# Patient Record
Sex: FEMALE | Race: BLACK OR AFRICAN AMERICAN | URBAN - METROPOLITAN AREA
[De-identification: names, ages, dates, MRNs, and addresses within clinical notes are randomized per-mention and may not be internally consistent; named-entity substitution may affect disease eponyms.]

---

## 2019-01-10 ENCOUNTER — HISTORICAL (OUTPATIENT)
Dept: ADMINISTRATIVE | Facility: HOSPITAL | Age: 49
End: 2019-01-10

## 2019-02-21 ENCOUNTER — HISTORICAL (OUTPATIENT)
Dept: PREADMISSION TESTING | Facility: HOSPITAL | Age: 49
End: 2019-02-21

## 2019-02-21 LAB
ABS NEUT (OLG): 3.14 X10(3)/MCL (ref 2.1–9.2)
BASOPHILS # BLD AUTO: 0 X10(3)/MCL (ref 0–0.2)
BASOPHILS NFR BLD AUTO: 1 %
BUN SERPL-MCNC: 9 MG/DL (ref 7–18)
CALCIUM SERPL-MCNC: 9.1 MG/DL (ref 8.5–10.1)
CHLORIDE SERPL-SCNC: 108 MMOL/L (ref 98–107)
CO2 SERPL-SCNC: 26 MMOL/L (ref 21–32)
CREAT SERPL-MCNC: 0.54 MG/DL (ref 0.55–1.02)
CREAT/UREA NIT SERPL: 16.7
EOSINOPHIL # BLD AUTO: 0.2 X10(3)/MCL (ref 0–0.9)
EOSINOPHIL NFR BLD AUTO: 3 %
ERYTHROCYTE [DISTWIDTH] IN BLOOD BY AUTOMATED COUNT: 13.2 % (ref 11.5–17)
GLUCOSE SERPL-MCNC: 86 MG/DL (ref 74–106)
HCT VFR BLD AUTO: 40.6 % (ref 37–47)
HGB BLD-MCNC: 12.4 GM/DL (ref 12–16)
LYMPHOCYTES # BLD AUTO: 2.9 X10(3)/MCL (ref 0.6–4.6)
LYMPHOCYTES NFR BLD AUTO: 42 %
MCH RBC QN AUTO: 30.9 PG (ref 27–31)
MCHC RBC AUTO-ENTMCNC: 30.5 GM/DL (ref 33–36)
MCV RBC AUTO: 101.2 FL (ref 80–94)
MONOCYTES # BLD AUTO: 0.5 X10(3)/MCL (ref 0.1–1.3)
MONOCYTES NFR BLD AUTO: 7 %
NEUTROPHILS # BLD AUTO: 3.14 X10(3)/MCL (ref 2.1–9.2)
NEUTROPHILS NFR BLD AUTO: 46 %
PLATELET # BLD AUTO: 382 X10(3)/MCL (ref 130–400)
PMV BLD AUTO: 10.7 FL (ref 9.4–12.4)
POTASSIUM SERPL-SCNC: 4.4 MMOL/L (ref 3.5–5.1)
RBC # BLD AUTO: 4.01 X10(6)/MCL (ref 4.2–5.4)
SODIUM SERPL-SCNC: 140 MMOL/L (ref 136–145)
WBC # SPEC AUTO: 6.8 X10(3)/MCL (ref 4.5–11.5)

## 2019-02-26 ENCOUNTER — HISTORICAL (OUTPATIENT)
Dept: SURGERY | Facility: HOSPITAL | Age: 49
End: 2019-02-26

## 2022-04-07 ENCOUNTER — HISTORICAL (OUTPATIENT)
Dept: ADMINISTRATIVE | Facility: HOSPITAL | Age: 52
End: 2022-04-07

## 2022-04-23 VITALS
SYSTOLIC BLOOD PRESSURE: 169 MMHG | WEIGHT: 163.13 LBS | DIASTOLIC BLOOD PRESSURE: 90 MMHG | HEIGHT: 64 IN | BODY MASS INDEX: 27.85 KG/M2

## 2022-04-28 NOTE — OP NOTE
DATE OF SURGERY:    02/26/2019    SURGEON:  Keyon Mendoza MD    PREOPERATIVE DIAGNOSIS:  Right knee medial meniscus tear.    POSTOPERATIVE DIAGNOSIS:  Right knee medial meniscus tear.    PROCEDURE PERFORMED:    1. Right knee arthroscopy with partial medial meniscectomy.  2. Right knee arthroscopic debridement anterior knee.    ANESTHESIA:  LMA.    IV FLUIDS:  As per Anesthesia.    ESTIMATED BLOOD LOSS:  Less than 5 cc.    COUNTS:  Correct.    COMPLICATIONS:  None.    DISPOSITION:  Stable to PACU.    INDICATIONS FOR PROCEDURE:  Ms. Bernard is a 48-year-old female with continued pain and loss of motion of her right knee.  She has failed conservative treatment.  She has been followed in my clinic.  She had an MRI demonstrating the above findings.  The risks, benefits, and alternatives were discussed with the patient, the patient's family in detail.  The risks included, but were not limited to, pain, bleeding, infection, damage to blood vessels or nerves, heart attack, stroke, death, need for operation, continued pain, continued loss of motion, retear, post-traumatic arthritis, need for additional surgery.  Patient understood these risks and wanted to proceed with surgical intervention.  Informed consent was obtained.    PROCEDURE IN DETAIL:  The patient was found in preop holding by Anesthesia and found fit for surgery.  She was taken to the operating room and placed on the operating table in supine position.  All bony prominences were well padded.  Time-out was called to identify correct patient, correct procedure, and correct site, and all were in agreement.  The patient underwent LMA anesthesia without complications.  She was then prepped and draped in normal sterile fashion, leaving the right lower extremity exposed for surgery.  A well padded tourniquet was placed on her right upper thigh.  Approximate tourniquet time was 13 minutes at 300.  She received preoperative antibiotics.  After exsanguination of the  right lower extremity, tourniquet was inflated.  Anterolateral portal was then made through a 1 cm incision.  Camera was introduced in suprapatellar pouch.  Next, the medial and lateral gutters were inspected.  No loose bodies.  Her patella was tracking appropriately.  Camera was introduced in the medial compartment.  Anteromedial portal was then made through a 1 cm incision.  Next, probing of the posterior horn and posterior body of the medial meniscus demonstrated an unstable medial meniscus tear to probing.  It was longitudinal in nature.  Given this, patient had a partial medial meniscectomy of the posterior horn and body.  With use of 3.5 shaver and straight biter, the tear was removed in its entirety.  The remaining meniscus was intact and found to be stable after probing.  She had some minimal arthritic changes of the medial compartment.  Camera was introduced in the intercondylar notch where patient had a redundant scar tissue and fat pad causing impingement.  With use of 3.5 shaver patient had debridement of the anterior knee.  The ACL and PCL were inspected and found to be intact.  Camera was introduced in the lateral compartment.  Lateral meniscus was then probed and found to be stable without any signs of tear or instability.  She had minimal arthritic changes in the lateral compartment.  Camera was introduced back in the suprapatellar pouch.  All excess fluid was removed.  Tourniquet was released, hemostasis achieved.  Copious irrigation was used to wash the 2 incisions.  The incisions were then closed with 2-0 nylon suture in standard horizontal mattress configuration.  Xeroform, 4 x 4's, soft tissue dressing, Ace wrap was placed on the right lower extremity.  She was then awoken by Anesthesia and brought to PACU in stable condition.        ______________________________  Keyon Mendoza MD    AK/UN  DD:  02/26/2019  Time:  10:10AM  DT:  02/26/2019  Time:  11:30AM  Job #:  [id number]

## 2025-04-02 RX ORDER — PANTOPRAZOLE SODIUM 20 MG/1
20 TABLET, DELAYED RELEASE ORAL DAILY
COMMUNITY

## 2025-04-02 RX ORDER — CETIRIZINE HYDROCHLORIDE 10 MG/1
10 TABLET ORAL DAILY
COMMUNITY

## 2025-04-02 RX ORDER — VALSARTAN 160 MG/1
160 TABLET ORAL DAILY
COMMUNITY

## 2025-04-02 RX ORDER — CYPROHEPTADINE HYDROCHLORIDE 4 MG/1
4 TABLET ORAL 3 TIMES DAILY PRN
Status: ON HOLD | COMMUNITY
End: 2025-04-04 | Stop reason: CLARIF

## 2025-04-02 RX ORDER — TRAZODONE HYDROCHLORIDE 100 MG/1
100 TABLET ORAL NIGHTLY
COMMUNITY

## 2025-04-02 RX ORDER — ATORVASTATIN CALCIUM 10 MG/1
10 TABLET, FILM COATED ORAL DAILY
COMMUNITY

## 2025-04-02 RX ORDER — CARVEDILOL 12.5 MG/1
12.5 TABLET ORAL 2 TIMES DAILY WITH MEALS
COMMUNITY

## 2025-04-04 ENCOUNTER — HOSPITAL ENCOUNTER (OUTPATIENT)
Facility: HOSPITAL | Age: 55
Discharge: HOME | End: 2025-04-04
Attending: PODIATRIST | Admitting: PODIATRIST
Payer: COMMERCIAL

## 2025-04-04 ENCOUNTER — ANESTHESIA (OUTPATIENT)
Dept: SURGERY | Facility: HOSPITAL | Age: 55
End: 2025-04-04
Payer: COMMERCIAL

## 2025-04-04 ENCOUNTER — ANESTHESIA EVENT (OUTPATIENT)
Dept: SURGERY | Facility: HOSPITAL | Age: 55
End: 2025-04-04
Payer: COMMERCIAL

## 2025-04-04 VITALS
RESPIRATION RATE: 18 BRPM | WEIGHT: 155.88 LBS | HEART RATE: 61 BPM | TEMPERATURE: 98 F | SYSTOLIC BLOOD PRESSURE: 185 MMHG | HEIGHT: 64 IN | OXYGEN SATURATION: 98 % | BODY MASS INDEX: 26.61 KG/M2 | DIASTOLIC BLOOD PRESSURE: 92 MMHG

## 2025-04-04 DIAGNOSIS — M79.672 LEFT FOOT PAIN: ICD-10-CM

## 2025-04-04 DIAGNOSIS — M67.471 GANGLION, RIGHT ANKLE AND FOOT: ICD-10-CM

## 2025-04-04 DIAGNOSIS — D21.22 BENIGN NEOPLASM OF CONNECTIVE AND SOFT TISSUE OF LEG, INCLUDING HIP, LEFT: ICD-10-CM

## 2025-04-04 PROCEDURE — 36000707: Performed by: PODIATRIST

## 2025-04-04 PROCEDURE — 37000008 HC ANESTHESIA 1ST 15 MINUTES: Performed by: PODIATRIST

## 2025-04-04 PROCEDURE — 37000009 HC ANESTHESIA EA ADD 15 MINS: Performed by: PODIATRIST

## 2025-04-04 PROCEDURE — 63600175 PHARM REV CODE 636 W HCPCS: Performed by: PODIATRIST

## 2025-04-04 PROCEDURE — 63600175 PHARM REV CODE 636 W HCPCS: Performed by: NURSE ANESTHETIST, CERTIFIED REGISTERED

## 2025-04-04 PROCEDURE — 63600175 PHARM REV CODE 636 W HCPCS: Performed by: ANESTHESIOLOGY

## 2025-04-04 PROCEDURE — 25000003 PHARM REV CODE 250: Performed by: NURSE ANESTHETIST, CERTIFIED REGISTERED

## 2025-04-04 PROCEDURE — 36000706: Performed by: PODIATRIST

## 2025-04-04 PROCEDURE — 71000015 HC POSTOP RECOV 1ST HR: Performed by: PODIATRIST

## 2025-04-04 PROCEDURE — 71000016 HC POSTOP RECOV ADDL HR: Performed by: PODIATRIST

## 2025-04-04 RX ORDER — BUPIVACAINE HYDROCHLORIDE 5 MG/ML
INJECTION, SOLUTION EPIDURAL; INTRACAUDAL; PERINEURAL
Status: DISCONTINUED
Start: 2025-04-04 | End: 2025-04-04 | Stop reason: HOSPADM

## 2025-04-04 RX ORDER — FENTANYL CITRATE 50 UG/ML
INJECTION, SOLUTION INTRAMUSCULAR; INTRAVENOUS
Status: DISCONTINUED | OUTPATIENT
Start: 2025-04-04 | End: 2025-04-04

## 2025-04-04 RX ORDER — MIDAZOLAM HYDROCHLORIDE 2 MG/2ML
2 INJECTION, SOLUTION INTRAMUSCULAR; INTRAVENOUS ONCE AS NEEDED
Status: DISCONTINUED | OUTPATIENT
Start: 2025-04-04 | End: 2025-04-04 | Stop reason: HOSPADM

## 2025-04-04 RX ORDER — MIDAZOLAM HYDROCHLORIDE 2 MG/2ML
2 INJECTION, SOLUTION INTRAMUSCULAR; INTRAVENOUS ONCE
Status: COMPLETED | OUTPATIENT
Start: 2025-04-04 | End: 2025-04-04

## 2025-04-04 RX ORDER — ASPIRIN 325 MG
50000 TABLET, DELAYED RELEASE (ENTERIC COATED) ORAL
COMMUNITY
Start: 2025-02-10

## 2025-04-04 RX ORDER — LIDOCAINE HYDROCHLORIDE 10 MG/ML
INJECTION, SOLUTION EPIDURAL; INFILTRATION; INTRACAUDAL; PERINEURAL
Status: DISCONTINUED | OUTPATIENT
Start: 2025-04-04 | End: 2025-04-04

## 2025-04-04 RX ORDER — SODIUM CHLORIDE, SODIUM GLUCONATE, SODIUM ACETATE, POTASSIUM CHLORIDE AND MAGNESIUM CHLORIDE 30; 37; 368; 526; 502 MG/100ML; MG/100ML; MG/100ML; MG/100ML; MG/100ML
INJECTION, SOLUTION INTRAVENOUS CONTINUOUS
Status: DISCONTINUED | OUTPATIENT
Start: 2025-04-04 | End: 2025-04-04 | Stop reason: HOSPADM

## 2025-04-04 RX ORDER — HYDROCODONE BITARTRATE AND ACETAMINOPHEN 5; 325 MG/1; MG/1
1 TABLET ORAL EVERY 4 HOURS PRN
Status: DISCONTINUED | OUTPATIENT
Start: 2025-04-04 | End: 2025-04-04 | Stop reason: HOSPADM

## 2025-04-04 RX ORDER — CARIPRAZINE 1.5 MG/1
1.5 CAPSULE, GELATIN COATED ORAL
COMMUNITY
Start: 2025-03-21

## 2025-04-04 RX ORDER — BUPIVACAINE HYDROCHLORIDE 5 MG/ML
INJECTION, SOLUTION EPIDURAL; INTRACAUDAL; PERINEURAL
Status: DISCONTINUED | OUTPATIENT
Start: 2025-04-04 | End: 2025-04-04 | Stop reason: HOSPADM

## 2025-04-04 RX ORDER — ONDANSETRON 4 MG/1
4 TABLET, ORALLY DISINTEGRATING ORAL ONCE
Status: DISCONTINUED | OUTPATIENT
Start: 2025-04-04 | End: 2025-04-04 | Stop reason: HOSPADM

## 2025-04-04 RX ORDER — CEFAZOLIN SODIUM 1 G/3ML
1 INJECTION, POWDER, FOR SOLUTION INTRAMUSCULAR; INTRAVENOUS
Status: COMPLETED | OUTPATIENT
Start: 2025-04-04 | End: 2025-04-04

## 2025-04-04 RX ORDER — LIDOCAINE HYDROCHLORIDE 10 MG/ML
INJECTION, SOLUTION INFILTRATION; PERINEURAL
Status: DISCONTINUED
Start: 2025-04-04 | End: 2025-04-04 | Stop reason: HOSPADM

## 2025-04-04 RX ORDER — ONDANSETRON HYDROCHLORIDE 2 MG/ML
4 INJECTION, SOLUTION INTRAVENOUS DAILY PRN
OUTPATIENT
Start: 2025-04-04

## 2025-04-04 RX ORDER — KETOROLAC TROMETHAMINE 30 MG/ML
INJECTION, SOLUTION INTRAMUSCULAR; INTRAVENOUS
Status: DISCONTINUED | OUTPATIENT
Start: 2025-04-04 | End: 2025-04-04

## 2025-04-04 RX ORDER — ONDANSETRON HYDROCHLORIDE 2 MG/ML
INJECTION, SOLUTION INTRAVENOUS
Status: DISCONTINUED | OUTPATIENT
Start: 2025-04-04 | End: 2025-04-04

## 2025-04-04 RX ORDER — ACETAMINOPHEN 325 MG/1
650 TABLET ORAL EVERY 4 HOURS PRN
Status: DISCONTINUED | OUTPATIENT
Start: 2025-04-04 | End: 2025-04-04 | Stop reason: HOSPADM

## 2025-04-04 RX ORDER — PROPOFOL 10 MG/ML
VIAL (ML) INTRAVENOUS CONTINUOUS PRN
Status: DISCONTINUED | OUTPATIENT
Start: 2025-04-04 | End: 2025-04-04

## 2025-04-04 RX ORDER — FLUTICASONE PROPIONATE 50 MCG
2 SPRAY, SUSPENSION (ML) NASAL
COMMUNITY
Start: 2024-12-27

## 2025-04-04 RX ORDER — GLUCAGON 1 MG
1 KIT INJECTION
OUTPATIENT
Start: 2025-04-04

## 2025-04-04 RX ORDER — LIDOCAINE HYDROCHLORIDE 10 MG/ML
1 INJECTION, SOLUTION EPIDURAL; INFILTRATION; INTRACAUDAL; PERINEURAL ONCE
Status: DISCONTINUED | OUTPATIENT
Start: 2025-04-04 | End: 2025-04-04 | Stop reason: HOSPADM

## 2025-04-04 RX ORDER — LIDOCAINE HYDROCHLORIDE 10 MG/ML
INJECTION, SOLUTION INFILTRATION; PERINEURAL
Status: DISCONTINUED | OUTPATIENT
Start: 2025-04-04 | End: 2025-04-04 | Stop reason: HOSPADM

## 2025-04-04 RX ORDER — SODIUM CHLORIDE, SODIUM GLUCONATE, SODIUM ACETATE, POTASSIUM CHLORIDE AND MAGNESIUM CHLORIDE 30; 37; 368; 526; 502 MG/100ML; MG/100ML; MG/100ML; MG/100ML; MG/100ML
INJECTION, SOLUTION INTRAVENOUS CONTINUOUS
OUTPATIENT
Start: 2025-04-04 | End: 2025-05-04

## 2025-04-04 RX ORDER — DIPHENHYDRAMINE HYDROCHLORIDE 50 MG/ML
25 INJECTION, SOLUTION INTRAMUSCULAR; INTRAVENOUS ONCE
OUTPATIENT
Start: 2025-04-04 | End: 2025-04-04

## 2025-04-04 RX ORDER — SODIUM CHLORIDE, SODIUM LACTATE, POTASSIUM CHLORIDE, CALCIUM CHLORIDE 600; 310; 30; 20 MG/100ML; MG/100ML; MG/100ML; MG/100ML
INJECTION, SOLUTION INTRAVENOUS CONTINUOUS
Status: DISCONTINUED | OUTPATIENT
Start: 2025-04-04 | End: 2025-04-04 | Stop reason: HOSPADM

## 2025-04-04 RX ADMIN — ONDANSETRON 4 MG: 2 INJECTION INTRAMUSCULAR; INTRAVENOUS at 09:04

## 2025-04-04 RX ADMIN — LIDOCAINE HYDROCHLORIDE 50 MG: 10 INJECTION, SOLUTION EPIDURAL; INFILTRATION; INTRACAUDAL; PERINEURAL at 09:04

## 2025-04-04 RX ADMIN — FENTANYL CITRATE 25 MCG: 50 INJECTION, SOLUTION INTRAMUSCULAR; INTRAVENOUS at 09:04

## 2025-04-04 RX ADMIN — KETOROLAC TROMETHAMINE 30 MG: 30 INJECTION, SOLUTION INTRAMUSCULAR at 10:04

## 2025-04-04 RX ADMIN — CEFAZOLIN 1 G: 330 INJECTION, POWDER, FOR SOLUTION INTRAMUSCULAR; INTRAVENOUS at 09:04

## 2025-04-04 RX ADMIN — PROPOFOL 30 MG: 10 INJECTION, EMULSION INTRAVENOUS at 09:04

## 2025-04-04 RX ADMIN — PROPOFOL 80 MCG/KG/MIN: 10 INJECTION, EMULSION INTRAVENOUS at 09:04

## 2025-04-04 RX ADMIN — SODIUM CHLORIDE: 9 INJECTION, SOLUTION INTRAVENOUS at 09:04

## 2025-04-04 RX ADMIN — PROPOFOL 50 MG: 10 INJECTION, EMULSION INTRAVENOUS at 09:04

## 2025-04-04 RX ADMIN — MIDAZOLAM HYDROCHLORIDE 2 MG: 1 INJECTION, SOLUTION INTRAMUSCULAR; INTRAVENOUS at 09:04

## 2025-04-04 NOTE — DISCHARGE SUMMARY
[facility] Surgical - Periop Services  Discharge Note  Short Stay    Procedure(s) (LRB):  EXCISION, FIBROMA / Left foot (Left)      OUTCOME: Patient tolerated treatment/procedure well without complication and is now ready for discharge.    DISPOSITION: Home or Self Care    FINAL DIAGNOSIS:  <principal problem not specified>    FOLLOWUP: In clinic    DISCHARGE INSTRUCTIONS:  No discharge procedures on file.     TIME SPENT ON DISCHARGE: 5 minutes

## 2025-04-04 NOTE — ANESTHESIA PREPROCEDURE EVALUATION
04/04/2025  Maribell Bernard is a 54 y.o., female.  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     I have reviewed the Nursing Notes. I have reviewed the NPO Status.   I have reviewed the Medications.     Review of Systems  Anesthesia Hx:  No problems with previous Anesthesia               Denies Personal Hx of Anesthesia complications.                    Hematology/Oncology:  Hematology Normal   Oncology Normal                                   EENT/Dental:  EENT/Dental Normal           Cardiovascular:  Exercise tolerance: good   Hypertension                  Functional Capacity good / => 4 METS                         Pulmonary:  Pulmonary Normal                       Renal/:   Denies Chronic Renal Disease.                Hepatic/GI:     GERD                Musculoskeletal:  Musculoskeletal Normal                Neurological:  Neurology Normal                                      Endocrine:  Endocrine Normal          Denies Morbid Obesity / BMI > 40  Dermatological:  Skin Normal    Psych:  Psychiatric Normal                    Physical Exam  General: Alert, Oriented, Well nourished and Cooperative    Airway:  Mallampati: II   Mouth Opening: Normal  TM Distance: Normal  Tongue: Normal  Neck ROM:  Normal ROM    Dental:  Intact    Chest/Lungs:  Clear to auscultation, Normal Respiratory Rate    Heart:  Rate: Normal  Rhythm: Regular Rhythm       Latest Reference Range & Units 02/21/19 11:08   WBC 4.5 - 11.5 x10(3)/mcL 6.8   RBC 4.20 - 5.40 x10(6)/mcL 4.01 (L)   Hemoglobin 12.0 - 16.0 gm/dL 12.4   Hematocrit 37.0 - 47.0 % 40.6   MCV 80.0 - 94.0 fL 101.2 (H)   MCH 27.0 - 31.0 pg 30.9   MCHC 33.0 - 36.0 gm/dL 30.5 (L)   RDW 11.5 - 17.0 % 13.2   Platelet Count 130 - 400 x10(3)/mcL 382   MPV 9.4 - 12.4 fL 10.7   Neut % % 46   LYMPH % % 42   Mono % % 7   Eos % % 3   Basophil % % 1   Gran # (ANC) 2.10 - 9.20 x10(3)/mcL 3.14   Neut # 2.10 - 9.20 x10(3)/mcL 3.14   Lymph # 0.6 - 4.6 x10(3)/mcL 2.9   Mono # 0.1 - 1.3 x10(3)/mcL 0.5   Eos # 0.0 - 0.9 x10(3)/mcL 0.2   Baso # 0.0 - 0.2 x10(3)/mcL 0.0   Sodium 136 - 145 mmol/L 140   Potassium 3.5 - 5.1 mmol/L 4.4   Chloride 98 - 107 mmol/L 108 (H)   CO2 21.0 - 32.0 mmol/L 26.0   BUN 7.0 - 18.0 mg/dL 9.0   Creatinine 0.55 - 1.02 mg/dL 0.54 (L)   BUN/CREAT RATIO  16.7   eGFR if non African American mL/min/1.73 m2 >60   eGFR if African American mL/min/1.73 m2 >60   Glucose 74 - 106 mg/dL 86   Calcium 8.5 - 10.1 mg/dL 9.1   (L): Data is abnormally low  (H): Data is abnormally high    Anesthesia Plan  Type of Anesthesia, risks & benefits discussed:    Anesthesia Type: MAC  Intra-op Monitoring Plan: Standard ASA Monitors  Post Op Pain Control Plan: multimodal analgesia  Induction:  IV  Airway Plan: Direct  Informed Consent: Informed consent signed with the Patient and all parties understand the risks and agree with anesthesia plan.  All questions answered. Patient consented to blood products? Yes  ASA Score: 2  Day of Surgery Review of History & Physical: H&P Update referred to the surgeon/provider.I have interviewed and examined the patient. I have reviewed the patient's H&P dated: There are no significant changes.     Ready For Surgery From Anesthesia Perspective.     .

## 2025-04-04 NOTE — DISCHARGE SUMMARY
[facility] Surgical - Periop Services  Discharge Note  Short Stay    Procedure(s) (LRB):  EXCISION, FIBROMA / Left foot (Left)      OUTCOME: Patient tolerated treatment/procedure well without complication and is now ready for discharge.    DISPOSITION: Home or Self Care    FINAL DIAGNOSIS:  <principal problem not specified>    FOLLOWUP: In clinic    DISCHARGE INSTRUCTIONS:  No discharge procedures on file.     TIME SPENT ON DISCHARGE: 5 minutes

## 2025-04-04 NOTE — TRANSFER OF CARE
"Anesthesia Transfer of Care Note    Patient: Maribell Bernard    Procedure(s) Performed: Procedure(s) (LRB):  EXCISION, FIBROMA / Left foot (Left)    Patient location: OPS    Anesthesia Type: general    Transport from OR: Transported from OR on room air with adequate spontaneous ventilation    Post pain: adequate analgesia    Post assessment: no apparent anesthetic complications    Post vital signs: stable    Level of consciousness: sedated and awake    Nausea/Vomiting: no nausea/vomiting    Complications: none    Transfer of care protocol was followed      Last vitals: Visit Vitals  BP (!) 178/94   Pulse 62   Temp 36.6 °C (97.9 °F) (Oral)   Resp 14   Ht 5' 4" (1.626 m)   Wt 70.7 kg (155 lb 13.8 oz)   SpO2 98%   Breastfeeding No   BMI 26.75 kg/m²     "

## 2025-04-04 NOTE — DISCHARGE INSTRUCTIONS
SURGERY HOME CARE INSTRUCTIONS     ACTIVITY. Keep your affected foot elevated over the next 48 hours. No weight bearing to surgical foot. Use knee scooter.     DIET. Gradually resume your regular diet. Do not drink alcohol beverages.     CARE OF INCISION. Keep your bandage clean, dry, and intact until your follow up appointment. Do not remove your bandages or inspect the wound. A small amount of blood on the bandage is normal. Limited swelling may occur and your skin may look bruised. Limited swelling and bruising are normal and expected. Do not smoke.     BATHING. Sponge bathe until your follow up appointment. You may take a shower when told by your doctor. No tub baths, swimming, or hot tubs for 2 weeks or when told by your doctor.     MEDICATION. You will receive instructions for your pain and/or antibiotic prescriptions. Take pain medication only if you need it and as directed. Take antibiotics as directed until all the pills are gone. If your medications cause stomach upset, headache, rash, or other abnormal reactions, stop taking them and call your doctor immediately.     WHEN TO CALL THE DOCTOR   Pain, burning, or numbness of the toes not relieved by elevation of the leg   Pale or cold toes, bluish nail beds   Red line going up leg   Excessive swelling   Fever over 100.4 degrees or higher and chills  Pain not relieved by the pain medication   Excessive bloody drainage or bandage become overly stained with bloody fluids   Your cast/bandage gets wet or you bump or injure the surgical site

## 2025-04-04 NOTE — ANESTHESIA POSTPROCEDURE EVALUATION
Anesthesia Post Evaluation    Patient: Maribell Bernard    Procedure(s) Performed: Procedure(s) (LRB):  EXCISION, FIBROMA / Left foot (Left)    Final Anesthesia Type: general      Patient location during evaluation: OPS  Patient participation: Yes- Able to Participate  Level of consciousness: awake and alert  Post-procedure vital signs: reviewed and stable  Pain management: adequate  Airway patency: patent    PONV status at discharge: No PONV  Anesthetic complications: no      Cardiovascular status: blood pressure returned to baseline  Respiratory status: unassisted  Hydration status: euvolemic  Follow-up not needed.              Vitals Value Taken Time         Pain/Jodi Score: No data recorded

## 2025-04-07 LAB — PSYCHE PATHOLOGY RESULT: NORMAL

## 2025-04-07 NOTE — OP NOTE
[facility] [address] [place] [address]    PATIENT NAME:      RAUDEL WHITTINGTON  YOB: 1970  CSN:               [id number]  MRN:               [id number]  ADMIT DATE:        04/04/2025 08:13:00  PHYSICIAN:         Nirmal Bryant DPM                          OPERATIVE REPORT      DATE OF SURGERY:    04/04/2025 03:13:24    SURGEON:  Nirmal Bryant DPM    PREOPERATIVE DIAGNOSIS:  Plantar fibroma, medial aspect, left foot.    POSTOPERATIVE DIAGNOSIS:  Plantar fibroma, medial aspect, left foot.    PROCEDURE PERFORMED:  Excision of soft tissue mass, plantar fibroma, plantar   aspect, left foot.    ANESTHESIA:  Local with MAC.    HEMOSTASIS:  Via Esmarch bandage about the left ankle.    DRAINS:  Quarter-inch Penrose drain was utilized for this procedure.    PROCEDURE AS FOLLOWS:  The patient was brought to the OR, placed in supine   position upon the operating table.  At this point, a local infiltrative block   totalling 40 cc of 0.5% Marcaine and 1% lidocaine plain in a 50:50 mixture was   introduced about the left foot and ankle.  The patient was then prepped and   draped in the usual sterile fashion.    Surgical procedure began with a curvilinear incision made about the plantar   aspect of the left foot extending just proximal to the 1st MPJ to the mid arch   area.  All superficial bleeding vessels were ligated with the Bovie.  Dissection   was carried down to deep tissue, where any bleeding vessels were ligated with   the Bovie.  At this point, identification of the plantar fibroma within the   medial band of the plantar fascia was identified.  The proximal aspect of the   fibroma was dissected free and then transected with a 15 blade.  It was then   reflected distally.  Medial and lateral margins were dissected free from the   plantar fascia as well as the deep tissue medially.   Once completely excised, it   was removed from the surgical field.  It will be sent for pathological   identification at this time.  Postoperatively, again any superficial bleeding   vessels were ligated with the Bovie.  There was noted to be no penetration of   the fibroma to any of the deep tissue structures.  Flexor hallucis longus and   flexor brevis tendons were both intact about the plantar aspect of the left foot   in this area.  This area was then copiously irrigated with neomycin solution.    The deep tissues were reapproximated with 4-0 Vicryl in simple fashion.  A   quarter-inch Penrose drain was placed in position extending from distal and   exiting through the incision proximally.  Once in position with a drain, the   skin was reapproximated with a horizontal mattress closure.  Once completed, a   dry sterile dressing, including Adaptic, 4x4s, 2 and 3 inch Raquel, and 3 inch   conform was applied with a Coban about the left foot, then 3 rolls of cast   padding and a 4 x 15 posterior splint was applied to the plantar and posterior   aspect of the left foot and ankle.  This was held in place with a 4 inch Ace   wrap.    The patient will remain nonweightbearing about the left foot with a rolling knee   walker.  She will follow up in the office in approximately 4 days for   re-evaluation and dressing change.        ______________________________  TARA Prado/NEALS  DD:  04/07/2025  Time:  10:09AM  DT:  04/07/2025  Time:  04:53PM  Job #:  [id number]      OPERATIVE REPORT

## (undated) DEVICE — BANDAGE MATRIX HK LOOP 4IN 5YD

## (undated) DEVICE — GLOVE BIOGEL 7.5

## (undated) DEVICE — NDL SYR 10ML 18X1.5 LL BLUNT

## (undated) DEVICE — SOL NACL IRR 1000ML BTL

## (undated) DEVICE — NDL HYPO REG 25G X 1 1/2

## (undated) DEVICE — DRESSING N ADH OIL EMUL 3X3

## (undated) DEVICE — STOCKINET 4INX48

## (undated) DEVICE — BLADE SURG STAINLESS STEEL #15

## (undated) DEVICE — ELECTRODE PATIENT RETURN DISP

## (undated) DEVICE — BNDG COFLEX FOAM LF2 ST 3X5YD

## (undated) DEVICE — GLOVE SENSICARE PI GRN 7.5

## (undated) DEVICE — GAUZE BANDAGE CONFORM 3X75IN

## (undated) DEVICE — SUT VICRYL PLUS 3-0 FS1 27

## (undated) DEVICE — PAD CAST SOF-ROL RAYON 4INX4YD

## (undated) DEVICE — BANDAGE ESMARK ELASTIC ST 4X9

## (undated) DEVICE — SUT ETHILON 3-0 FS-1 30

## (undated) DEVICE — GAUZE BANDAGE CONFORM 2X75IN

## (undated) DEVICE — SUPPORT ULNA NERVE PROTECTOR

## (undated) DEVICE — SUT 4.0 ETHILON